# Patient Record
Sex: FEMALE | Race: WHITE
[De-identification: names, ages, dates, MRNs, and addresses within clinical notes are randomized per-mention and may not be internally consistent; named-entity substitution may affect disease eponyms.]

---

## 2020-01-24 ENCOUNTER — HOSPITAL ENCOUNTER (OUTPATIENT)
Age: 35
End: 2020-01-24
Payer: MEDICAID

## 2020-01-24 DIAGNOSIS — R13.10: ICD-10-CM

## 2020-01-24 DIAGNOSIS — J02.9: Primary | ICD-10-CM

## 2020-01-24 LAB
ALBUMIN LEVEL: 3.6 GM/DL (ref 3.4–5)
ALBUMIN/GLOB SERPL: 1.3 {RATIO} (ref 1.1–1.8)
ALP ISO SERPL-ACNC: 45 U/L (ref 46–116)
ALT SERPLBLD-CCNC: 81 U/L (ref 12–78)
ANION GAP SERPL CALC-SCNC: 9.3 MEQ/L (ref 5–15)
AST SERPL QL: 60 U/L (ref 15–37)
BILIRUBIN,TOTAL: 0.3 MG/DL (ref 0.2–1)
BUN SERPL-MCNC: 10 MG/DL (ref 7–18)
CALCIUM SPEC-MCNC: 8.5 MG/DL (ref 8.5–10.1)
CHLORIDE SPEC-SCNC: 107 MMOL/L (ref 98–107)
CO2 SERPL-SCNC: 28 MMOL/L (ref 21–32)
CREAT BLD-SCNC: 0.96 MG/DL (ref 0.55–1.02)
ESTIMATED GLOMERULAR FILT RATE: 67 ML/MIN (ref 60–?)
GFR (AFRICAN AMERICAN): 81 ML/MIN (ref 60–?)
GLOBULIN SER CALC-MCNC: 2.8 GM/DL (ref 1.3–3.2)
GLUCOSE: 84 MG/DL (ref 74–106)
HCT VFR BLD CALC: 35.8 % (ref 37–47)
HGB BLD-MCNC: 12.1 G/DL (ref 12.2–16.2)
MCHC RBC-ENTMCNC: 33.9 G/DL (ref 31.8–35.4)
MCV RBC: 96 FL (ref 81–99)
MEAN CORPUSCULAR HEMOGLOBIN: 32.6 PG (ref 27–31.2)
PLATELET # BLD: 159 K/MM3 (ref 142–424)
POTASSIUM: 4.3 MMOL/L (ref 3.5–5.1)
PROT SERPL-MCNC: 6.4 GM/DL (ref 6.4–8.2)
RBC # BLD AUTO: 3.73 M/MM3 (ref 4.2–5.4)
SODIUM SPEC-SCNC: 140 MMOL/L (ref 136–145)
T4 (THYROXINE): 7.5 UG/DL (ref 4.7–13.3)
TSH SERPL-ACNC: 0.42 UIU/ML (ref 0.36–3.74)
WBC # BLD AUTO: 4.2 K/MM3 (ref 4.8–10.8)

## 2020-01-24 PROCEDURE — 80053 COMPREHEN METABOLIC PANEL: CPT

## 2020-01-24 PROCEDURE — 84443 ASSAY THYROID STIM HORMONE: CPT

## 2020-01-24 PROCEDURE — 36415 COLL VENOUS BLD VENIPUNCTURE: CPT

## 2020-01-24 PROCEDURE — 84436 ASSAY OF TOTAL THYROXINE: CPT

## 2020-01-24 PROCEDURE — 86318 IA INFECTIOUS AGENT ANTIBODY: CPT

## 2020-01-24 PROCEDURE — 85025 COMPLETE CBC W/AUTO DIFF WBC: CPT

## 2020-02-10 ENCOUNTER — HOSPITAL ENCOUNTER (OUTPATIENT)
Age: 35
End: 2020-02-10
Payer: MEDICAID

## 2020-02-10 DIAGNOSIS — Z79.899: Primary | ICD-10-CM

## 2020-02-10 PROCEDURE — 80305 DRUG TEST PRSMV DIR OPT OBS: CPT

## 2020-02-24 ENCOUNTER — HOSPITAL ENCOUNTER (OUTPATIENT)
Age: 35
End: 2020-02-24
Payer: MEDICAID

## 2020-02-24 DIAGNOSIS — F11.10: Primary | ICD-10-CM

## 2020-02-24 PROCEDURE — 80305 DRUG TEST PRSMV DIR OPT OBS: CPT

## 2020-02-27 ENCOUNTER — HOSPITAL ENCOUNTER (OUTPATIENT)
Age: 35
End: 2020-02-27
Payer: MEDICAID

## 2020-02-27 DIAGNOSIS — F19.20: Primary | ICD-10-CM

## 2020-02-27 PROCEDURE — 80305 DRUG TEST PRSMV DIR OPT OBS: CPT

## 2020-05-21 ENCOUNTER — HOSPITAL ENCOUNTER (OUTPATIENT)
Age: 35
End: 2020-05-21
Payer: MEDICAID

## 2020-05-21 DIAGNOSIS — Z02.83: Primary | ICD-10-CM

## 2020-05-21 DIAGNOSIS — Z79.899: ICD-10-CM

## 2020-05-21 PROCEDURE — 80353 DRUG SCREENING COCAINE: CPT

## 2020-05-21 PROCEDURE — 80305 DRUG TEST PRSMV DIR OPT OBS: CPT

## 2020-05-29 LAB — BENZOYLECGONINE (GC/MS): (no result) NG/ML

## 2020-07-29 ENCOUNTER — HOSPITAL ENCOUNTER (OUTPATIENT)
Age: 35
End: 2020-07-29
Payer: MEDICAID

## 2020-07-29 DIAGNOSIS — R56.9: Primary | ICD-10-CM

## 2020-09-10 ENCOUNTER — HOSPITAL ENCOUNTER (EMERGENCY)
Age: 35
Discharge: HOME | End: 2020-09-10
Payer: MEDICAID

## 2020-09-10 VITALS
RESPIRATION RATE: 21 BRPM | OXYGEN SATURATION: 99 % | TEMPERATURE: 98.42 F | DIASTOLIC BLOOD PRESSURE: 81 MMHG | HEART RATE: 110 BPM | SYSTOLIC BLOOD PRESSURE: 129 MMHG

## 2020-09-10 VITALS — HEART RATE: 80 BPM

## 2020-09-10 VITALS
OXYGEN SATURATION: 99 % | SYSTOLIC BLOOD PRESSURE: 129 MMHG | TEMPERATURE: 98.4 F | HEART RATE: 80 BPM | RESPIRATION RATE: 21 BRPM | DIASTOLIC BLOOD PRESSURE: 81 MMHG

## 2020-09-10 VITALS — BODY MASS INDEX: 45.3 KG/M2

## 2020-09-10 DIAGNOSIS — Z87.891: ICD-10-CM

## 2020-09-10 DIAGNOSIS — R11.10: Primary | ICD-10-CM

## 2020-09-10 DIAGNOSIS — Z21: ICD-10-CM

## 2020-09-10 DIAGNOSIS — F12.10: ICD-10-CM

## 2020-09-10 DIAGNOSIS — Z03.818: ICD-10-CM

## 2020-09-10 DIAGNOSIS — F41.8: ICD-10-CM

## 2020-09-10 PROCEDURE — U0004 COV-19 TEST NON-CDC HGH THRU: HCPCS

## 2020-09-10 PROCEDURE — 99202 OFFICE O/P NEW SF 15 MIN: CPT

## 2021-03-23 ENCOUNTER — HOSPITAL ENCOUNTER (OUTPATIENT)
Age: 36
End: 2021-03-23
Payer: MEDICAID

## 2021-03-23 DIAGNOSIS — K43.9: Primary | ICD-10-CM

## 2021-03-23 PROCEDURE — 74176 CT ABD & PELVIS W/O CONTRAST: CPT

## 2021-03-30 ENCOUNTER — HOSPITAL ENCOUNTER (OUTPATIENT)
Age: 36
End: 2021-03-30
Payer: MEDICAID

## 2021-03-30 DIAGNOSIS — Z20.822: ICD-10-CM

## 2021-03-30 DIAGNOSIS — Z01.812: Primary | ICD-10-CM

## 2021-03-30 DIAGNOSIS — K43.9: ICD-10-CM

## 2021-03-30 LAB
ANION GAP SERPL CALC-SCNC: 14.3 MEQ/L (ref 5–15)
BUN SERPL-MCNC: 15 MG/DL (ref 7–17)
CALCIUM SPEC-MCNC: 9.4 MG/DL (ref 8.4–10.2)
CHLORIDE SPEC-SCNC: 107 MMOL/L (ref 98–107)
CO2 SERPL-SCNC: 21 MMOL/L (ref 22–30)
CREAT BLD-SCNC: 0.9 MG/DL (ref 0.52–1.04)
ESTIMATED GLOMERULAR FILT RATE: 72 ML/MIN (ref 60–?)
GFR (AFRICAN AMERICAN): 87 ML/MIN (ref 60–?)
GLUCOSE: 132 MG/DL (ref 74–100)
HCT VFR BLD CALC: 41 % (ref 37–47)
HGB BLD-MCNC: 13.8 G/DL (ref 12.2–16.2)
MCHC RBC-ENTMCNC: 33.7 G/DL (ref 31.8–35.4)
MCV RBC: 94.5 FL (ref 81–99)
MEAN CORPUSCULAR HEMOGLOBIN: 31.9 PG (ref 27–31.2)
PLATELET # BLD: 199 K/MM3 (ref 142–424)
POTASSIUM: 4.3 MMOL/L (ref 3.5–5.1)
RBC # BLD AUTO: 4.34 M/MM3 (ref 4.2–5.4)
SODIUM SPEC-SCNC: 138 MMOL/L (ref 136–145)
WBC # BLD AUTO: 7 K/MM3 (ref 4.8–10.8)

## 2021-03-30 PROCEDURE — 80048 BASIC METABOLIC PNL TOTAL CA: CPT

## 2021-03-30 PROCEDURE — 84703 CHORIONIC GONADOTROPIN ASSAY: CPT

## 2021-03-30 PROCEDURE — 86328 IA NFCT AB SARSCOV2 COVID19: CPT

## 2021-03-30 PROCEDURE — 85025 COMPLETE CBC W/AUTO DIFF WBC: CPT

## 2021-03-30 PROCEDURE — 36415 COLL VENOUS BLD VENIPUNCTURE: CPT

## 2021-04-01 ENCOUNTER — HOSPITAL ENCOUNTER (OUTPATIENT)
Dept: HOSPITAL 22 - OR | Age: 36
Discharge: HOME | End: 2021-04-01
Payer: MEDICAID

## 2021-04-01 VITALS
HEART RATE: 89 BPM | SYSTOLIC BLOOD PRESSURE: 123 MMHG | OXYGEN SATURATION: 100 % | DIASTOLIC BLOOD PRESSURE: 83 MMHG | RESPIRATION RATE: 18 BRPM

## 2021-04-01 VITALS
HEART RATE: 101 BPM | RESPIRATION RATE: 18 BRPM | OXYGEN SATURATION: 94 % | SYSTOLIC BLOOD PRESSURE: 131 MMHG | DIASTOLIC BLOOD PRESSURE: 87 MMHG

## 2021-04-01 VITALS
DIASTOLIC BLOOD PRESSURE: 92 MMHG | TEMPERATURE: 97 F | SYSTOLIC BLOOD PRESSURE: 113 MMHG | OXYGEN SATURATION: 96 % | HEART RATE: 77 BPM | RESPIRATION RATE: 18 BRPM

## 2021-04-01 VITALS
DIASTOLIC BLOOD PRESSURE: 84 MMHG | TEMPERATURE: 97.2 F | HEART RATE: 85 BPM | RESPIRATION RATE: 18 BRPM | OXYGEN SATURATION: 97 % | SYSTOLIC BLOOD PRESSURE: 113 MMHG

## 2021-04-01 VITALS
OXYGEN SATURATION: 94 % | DIASTOLIC BLOOD PRESSURE: 76 MMHG | HEART RATE: 100 BPM | SYSTOLIC BLOOD PRESSURE: 165 MMHG | RESPIRATION RATE: 18 BRPM | TEMPERATURE: 97.4 F

## 2021-04-01 VITALS
DIASTOLIC BLOOD PRESSURE: 90 MMHG | TEMPERATURE: 97.5 F | RESPIRATION RATE: 12 BRPM | OXYGEN SATURATION: 94 % | HEART RATE: 115 BPM | SYSTOLIC BLOOD PRESSURE: 137 MMHG

## 2021-04-01 VITALS
OXYGEN SATURATION: 98 % | HEART RATE: 97 BPM | RESPIRATION RATE: 18 BRPM | DIASTOLIC BLOOD PRESSURE: 77 MMHG | SYSTOLIC BLOOD PRESSURE: 117 MMHG

## 2021-04-01 VITALS
DIASTOLIC BLOOD PRESSURE: 79 MMHG | SYSTOLIC BLOOD PRESSURE: 123 MMHG | HEART RATE: 86 BPM | OXYGEN SATURATION: 100 % | RESPIRATION RATE: 18 BRPM

## 2021-04-01 VITALS
DIASTOLIC BLOOD PRESSURE: 55 MMHG | RESPIRATION RATE: 18 BRPM | OXYGEN SATURATION: 94 % | SYSTOLIC BLOOD PRESSURE: 114 MMHG | HEART RATE: 104 BPM | TEMPERATURE: 97.34 F

## 2021-04-01 VITALS — BODY MASS INDEX: 32.8 KG/M2

## 2021-04-01 DIAGNOSIS — Z82.3: ICD-10-CM

## 2021-04-01 DIAGNOSIS — Z86.73: ICD-10-CM

## 2021-04-01 DIAGNOSIS — K42.0: Primary | ICD-10-CM

## 2021-04-01 DIAGNOSIS — Z72.0: ICD-10-CM

## 2021-04-01 DIAGNOSIS — R56.9: ICD-10-CM

## 2021-04-01 DIAGNOSIS — Z80.0: ICD-10-CM

## 2021-04-01 DIAGNOSIS — F41.9: ICD-10-CM

## 2021-04-01 DIAGNOSIS — I72.9: ICD-10-CM

## 2021-04-01 DIAGNOSIS — I67.89: ICD-10-CM

## 2021-04-01 DIAGNOSIS — Z79.899: ICD-10-CM

## 2021-04-01 DIAGNOSIS — F32.9: ICD-10-CM

## 2021-04-01 LAB
COLOR UR: YELLOW
MICRO URNS: (no result)
PH UR: 6 [PH] (ref 5–8.5)
SP GR UR: 1.02 (ref 1–1.03)
SQUAMOUS URNS QL MICRO: (no result) #/HPF (ref 0–5)
UROBILINOGEN UR QL: 0.2 EU/DL
WBC # UR: (no result) #/HPF (ref 0–3)

## 2021-04-01 PROCEDURE — 81001 URINALYSIS AUTO W/SCOPE: CPT

## 2021-04-01 PROCEDURE — 81025 URINE PREGNANCY TEST: CPT

## 2021-04-01 PROCEDURE — 96374 THER/PROPH/DIAG INJ IV PUSH: CPT

## 2021-04-01 PROCEDURE — 49587: CPT

## 2021-04-05 VITALS — SYSTOLIC BLOOD PRESSURE: 117 MMHG | HEART RATE: 97 BPM | DIASTOLIC BLOOD PRESSURE: 77 MMHG | TEMPERATURE: 97.34 F

## 2022-06-29 LAB
25-OH VITAMIN D, TOTAL: 38.2 NG/ML (ref 30–100)
ALBUMIN LEVEL: 4.1 G/DL (ref 3.5–5)
ALBUMIN/GLOB SERPL: 1.3 {RATIO} (ref 1.1–1.8)
ALP ISO SERPL-ACNC: 71 U/L (ref 38–126)
ALT SERPLBLD-CCNC: 59 U/L (ref 12–78)
ANION GAP SERPL CALC-SCNC: 14.2 MEQ/L (ref 5–15)
AST SERPL QL: 54 U/L (ref 14–36)
BILIRUBIN,TOTAL: < 0.1 MG/DL (ref 0.2–1.3)
BUN SERPL-MCNC: 17 MG/DL (ref 7–17)
CALCIUM SPEC-MCNC: 8.9 MG/DL (ref 8.4–10.2)
CHLORIDE SPEC-SCNC: 107 MMOL/L (ref 98–107)
CHOLEST SPEC-SCNC: 200 MG/DL (ref 140–200)
CO2 SERPL-SCNC: 20 MMOL/L (ref 22–30)
CREAT BLD-SCNC: 0.7 MG/DL (ref 0.52–1.04)
ESTIMATED GLOMERULAR FILT RATE: 95 ML/MIN (ref 60–?)
GFR (AFRICAN AMERICAN): 115 ML/MIN (ref 60–?)
GLOBULIN SER CALC-MCNC: 3.1 G/DL (ref 1.3–3.2)
GLUCOSE: 112 MG/DL (ref 74–100)
HCT VFR BLD CALC: 38.4 % (ref 37–47)
HDLC SERPL-MCNC: 45 MG/DL (ref 40–60)
HGB BLD-MCNC: 13 G/DL (ref 12.2–16.2)
MCHC RBC-ENTMCNC: 33.9 G/DL (ref 31.8–35.4)
MCV RBC: 93.5 FL (ref 81–99)
MEAN CORPUSCULAR HEMOGLOBIN: 31.7 PG (ref 27–31.2)
PLATELET # BLD: 178 K/MM3 (ref 142–424)
POTASSIUM: 4.2 MMOL/L (ref 3.5–5.1)
PROT SERPL-MCNC: 7.2 G/DL (ref 6.3–8.2)
RBC # BLD AUTO: 4.1 M/MM3 (ref 4.2–5.4)
SODIUM SPEC-SCNC: 137 MMOL/L (ref 136–145)
T4 FREE SERPL-MCNC: 0.89 NG/DL (ref 0.78–2.19)
TRIGLYCERIDES: 137 MG/DL (ref 30–150)
TSH SERPL-ACNC: 1.26 UIU/ML (ref 0.47–4.68)
WBC # BLD AUTO: 6.7 K/MM3 (ref 4.8–10.8)

## 2022-06-30 ENCOUNTER — HOSPITAL ENCOUNTER (OUTPATIENT)
Age: 37
End: 2022-06-30
Payer: MEDICAID

## 2022-06-30 DIAGNOSIS — E55.9: ICD-10-CM

## 2022-06-30 DIAGNOSIS — E66.9: Primary | ICD-10-CM

## 2022-06-30 PROCEDURE — 85025 COMPLETE CBC W/AUTO DIFF WBC: CPT

## 2022-06-30 PROCEDURE — 84439 ASSAY OF FREE THYROXINE: CPT

## 2022-06-30 PROCEDURE — 80061 LIPID PANEL: CPT

## 2022-06-30 PROCEDURE — 82306 VITAMIN D 25 HYDROXY: CPT

## 2022-06-30 PROCEDURE — 84443 ASSAY THYROID STIM HORMONE: CPT

## 2022-06-30 PROCEDURE — 80053 COMPREHEN METABOLIC PANEL: CPT

## 2022-07-08 ENCOUNTER — HOSPITAL ENCOUNTER (OUTPATIENT)
Age: 37
End: 2022-07-08
Payer: MEDICAID

## 2022-07-08 DIAGNOSIS — R13.10: Primary | ICD-10-CM

## 2022-07-08 PROCEDURE — 74220 X-RAY XM ESOPHAGUS 1CNTRST: CPT

## 2022-09-09 ENCOUNTER — HOSPITAL ENCOUNTER (OUTPATIENT)
Age: 37
End: 2022-09-09
Payer: MEDICAID

## 2022-09-09 DIAGNOSIS — Z02.83: ICD-10-CM

## 2022-09-09 DIAGNOSIS — Z00.00: Primary | ICD-10-CM

## 2022-09-09 DIAGNOSIS — Z79.899: ICD-10-CM

## 2022-09-09 LAB
ALBUMIN LEVEL: 4 G/DL (ref 3.5–5)
ALBUMIN/GLOB SERPL: 1.3 {RATIO} (ref 1.1–1.8)
ALP ISO SERPL-ACNC: 108 U/L (ref 38–126)
ALT SERPLBLD-CCNC: 38 U/L (ref 12–78)
ANION GAP SERPL CALC-SCNC: 13.2 MEQ/L (ref 5–15)
AST SERPL QL: 38 U/L (ref 14–36)
BILIRUBIN,TOTAL: < 0.1 MG/DL (ref 0.2–1.3)
BUN SERPL-MCNC: 11 MG/DL (ref 7–17)
CALCIUM SPEC-MCNC: 8.5 MG/DL (ref 8.4–10.2)
CHLORIDE SPEC-SCNC: 104 MMOL/L (ref 98–107)
CO2 SERPL-SCNC: 26 MMOL/L (ref 22–30)
CREAT BLD-SCNC: 0.8 MG/DL (ref 0.52–1.04)
ESTIMATED GLOMERULAR FILT RATE: 82 ML/MIN (ref 60–?)
FT4I SERPL CALC-MCNC: 1.7 UG/DL (ref 5.93–13.13)
GFR (AFRICAN AMERICAN): 99 ML/MIN (ref 60–?)
GLOBULIN SER CALC-MCNC: 3.2 G/DL (ref 1.3–3.2)
GLUCOSE: 90 MG/DL (ref 74–100)
HBA1C MFR BLD: 5 % (ref 4–6)
HCT VFR BLD CALC: 40.3 % (ref 37–47)
HGB BLD-MCNC: 13.1 G/DL (ref 12.2–16.2)
MCHC RBC-ENTMCNC: 32.5 G/DL (ref 31.8–35.4)
MCV RBC: 99.7 FL (ref 81–99)
MEAN CORPUSCULAR HEMOGLOBIN: 32.4 PG (ref 27–31.2)
PLATELET # BLD: 198 K/MM3 (ref 142–424)
POTASSIUM: 4.2 MMOL/L (ref 3.5–5.1)
PROT SERPL-MCNC: 7.2 G/DL (ref 6.3–8.2)
RBC # BLD AUTO: 4.04 M/MM3 (ref 4.2–5.4)
SODIUM SPEC-SCNC: 139 MMOL/L (ref 136–145)
T3RU NFR SERPL: 28 % (ref 23.5–40.5)
T4 (THYROXINE): 6.1 UG/DL (ref 5.53–11)
TSH SERPL-ACNC: 1.81 UIU/ML (ref 0.47–4.68)
VITAMIN B12: 213 PG/ML (ref 239–931)
WBC # BLD AUTO: 8 K/MM3 (ref 4.8–10.8)

## 2022-09-09 PROCEDURE — 84443 ASSAY THYROID STIM HORMONE: CPT

## 2022-09-09 PROCEDURE — 82652 VIT D 1 25-DIHYDROXY: CPT

## 2022-09-09 PROCEDURE — 84436 ASSAY OF TOTAL THYROXINE: CPT

## 2022-09-09 PROCEDURE — 82607 VITAMIN B-12: CPT

## 2022-09-09 PROCEDURE — 80305 DRUG TEST PRSMV DIR OPT OBS: CPT

## 2022-09-09 PROCEDURE — 80053 COMPREHEN METABOLIC PANEL: CPT

## 2022-09-09 PROCEDURE — 36415 COLL VENOUS BLD VENIPUNCTURE: CPT

## 2022-09-09 PROCEDURE — 83036 HEMOGLOBIN GLYCOSYLATED A1C: CPT

## 2022-09-09 PROCEDURE — 85025 COMPLETE CBC W/AUTO DIFF WBC: CPT

## 2022-09-09 PROCEDURE — 84479 ASSAY OF THYROID (T3 OR T4): CPT

## 2022-09-18 LAB
1,25-DIHYDROXY, VITAMIN D-2: <10 PG/ML
1,25-DIHYDROXY, VITAMIN D-3: 52 PG/ML
VIT D25+D1,25 OH+D1,25 PNL SERPL-MCNC: 52 PG/ML

## 2022-09-21 ENCOUNTER — HOSPITAL ENCOUNTER (OUTPATIENT)
Age: 37
End: 2022-09-21
Payer: MEDICAID

## 2022-09-21 DIAGNOSIS — Z79.899: Primary | ICD-10-CM

## 2022-09-21 PROCEDURE — 80305 DRUG TEST PRSMV DIR OPT OBS: CPT

## 2022-12-14 ENCOUNTER — HOSPITAL ENCOUNTER (OUTPATIENT)
Age: 37
End: 2022-12-14
Payer: MEDICAID

## 2022-12-14 DIAGNOSIS — Z79.899: Primary | ICD-10-CM

## 2022-12-14 PROCEDURE — 80305 DRUG TEST PRSMV DIR OPT OBS: CPT

## 2023-04-18 ENCOUNTER — HOSPITAL ENCOUNTER (OUTPATIENT)
Age: 38
End: 2023-04-18
Payer: MEDICAID

## 2023-04-18 DIAGNOSIS — Z79.899: Primary | ICD-10-CM

## 2023-04-18 PROCEDURE — 80305 DRUG TEST PRSMV DIR OPT OBS: CPT

## 2023-06-05 ENCOUNTER — HOSPITAL ENCOUNTER (OUTPATIENT)
Age: 38
End: 2023-06-05
Payer: MEDICAID

## 2023-06-05 DIAGNOSIS — F19.11: Primary | ICD-10-CM

## 2023-06-05 PROCEDURE — 80305 DRUG TEST PRSMV DIR OPT OBS: CPT

## 2023-06-09 ENCOUNTER — HOSPITAL ENCOUNTER (OUTPATIENT)
Age: 38
End: 2023-06-09
Payer: MEDICAID

## 2023-06-09 DIAGNOSIS — M25.572: ICD-10-CM

## 2023-06-09 DIAGNOSIS — M79.672: Primary | ICD-10-CM

## 2023-06-09 PROCEDURE — 73610 X-RAY EXAM OF ANKLE: CPT

## 2023-06-09 PROCEDURE — 73630 X-RAY EXAM OF FOOT: CPT

## 2023-07-17 ENCOUNTER — HOSPITAL ENCOUNTER (OUTPATIENT)
Dept: HOSPITAL 22 - PT | Age: 38
Discharge: HOME | End: 2023-07-17
Payer: MEDICAID

## 2023-07-17 DIAGNOSIS — M79.672: Primary | ICD-10-CM

## 2023-07-17 DIAGNOSIS — M25.572: ICD-10-CM

## 2023-07-17 PROCEDURE — 97760 ORTHOTIC MGMT&TRAING 1ST ENC: CPT

## 2023-09-11 ENCOUNTER — HOSPITAL ENCOUNTER (OUTPATIENT)
Age: 38
End: 2023-09-11
Payer: MEDICAID

## 2023-09-11 DIAGNOSIS — F41.9: Primary | ICD-10-CM

## 2023-09-11 PROCEDURE — 80305 DRUG TEST PRSMV DIR OPT OBS: CPT

## 2023-12-26 ENCOUNTER — HOSPITAL ENCOUNTER (OUTPATIENT)
Dept: HOSPITAL 22 - LAB.DROPOF | Age: 38
End: 2023-12-26
Payer: MEDICAID

## 2023-12-26 DIAGNOSIS — M79.2: Primary | ICD-10-CM

## 2023-12-26 DIAGNOSIS — B96.29: ICD-10-CM

## 2023-12-26 DIAGNOSIS — N39.0: ICD-10-CM

## 2023-12-26 DIAGNOSIS — Z79.899: ICD-10-CM

## 2023-12-26 PROCEDURE — 87086 URINE CULTURE/COLONY COUNT: CPT

## 2023-12-26 PROCEDURE — 80307 DRUG TEST PRSMV CHEM ANLYZR: CPT

## 2024-01-30 ENCOUNTER — TELEPHONE (OUTPATIENT)
Dept: FAMILY MEDICINE CLINIC | Facility: CLINIC | Age: 39
End: 2024-01-30

## 2024-01-30 ENCOUNTER — OFFICE VISIT (OUTPATIENT)
Dept: FAMILY MEDICINE CLINIC | Facility: CLINIC | Age: 39
End: 2024-01-30
Payer: MEDICAID

## 2024-01-30 VITALS
RESPIRATION RATE: 18 BRPM | OXYGEN SATURATION: 99 % | HEART RATE: 96 BPM | DIASTOLIC BLOOD PRESSURE: 80 MMHG | HEIGHT: 67 IN | BODY MASS INDEX: 36.26 KG/M2 | SYSTOLIC BLOOD PRESSURE: 102 MMHG | WEIGHT: 231 LBS | TEMPERATURE: 97.8 F

## 2024-01-30 DIAGNOSIS — F17.200 SMOKER: ICD-10-CM

## 2024-01-30 DIAGNOSIS — F19.11 HISTORY OF DRUG ABUSE: ICD-10-CM

## 2024-01-30 DIAGNOSIS — F41.9 ANXIETY AND DEPRESSION: Primary | ICD-10-CM

## 2024-01-30 DIAGNOSIS — Z86.73 HISTORY OF STROKE: ICD-10-CM

## 2024-01-30 DIAGNOSIS — G89.4 CHRONIC PAIN SYNDROME: ICD-10-CM

## 2024-01-30 DIAGNOSIS — E66.01 CLASS 2 SEVERE OBESITY DUE TO EXCESS CALORIES WITH SERIOUS COMORBIDITY AND BODY MASS INDEX (BMI) OF 36.0 TO 36.9 IN ADULT: ICD-10-CM

## 2024-01-30 DIAGNOSIS — F32.A ANXIETY AND DEPRESSION: Primary | ICD-10-CM

## 2024-01-30 DIAGNOSIS — F51.01 PRIMARY INSOMNIA: ICD-10-CM

## 2024-01-30 DIAGNOSIS — G81.94 LEFT HEMIPARESIS: ICD-10-CM

## 2024-01-30 RX ORDER — MIRTAZAPINE 45 MG/1
45 TABLET, FILM COATED ORAL
COMMUNITY
Start: 2024-01-26

## 2024-01-30 RX ORDER — ALPRAZOLAM 1 MG/1
1 TABLET ORAL 3 TIMES DAILY PRN
COMMUNITY
Start: 2023-12-22

## 2024-01-30 RX ORDER — BUPRENORPHINE HYDROCHLORIDE AND NALOXONE HYDROCHLORIDE DIHYDRATE 8; 2 MG/1; MG/1
1 TABLET SUBLINGUAL DAILY
COMMUNITY
Start: 2024-01-26

## 2024-01-30 RX ORDER — GABAPENTIN 800 MG/1
800 TABLET ORAL 3 TIMES DAILY
COMMUNITY
Start: 2024-01-25

## 2024-01-30 RX ORDER — BACLOFEN 10 MG/1
1 TABLET ORAL 3 TIMES DAILY
COMMUNITY
Start: 2024-01-19

## 2024-01-30 RX ORDER — CLONAZEPAM 1 MG/1
1 TABLET ORAL 2 TIMES DAILY PRN
COMMUNITY
Start: 2024-01-25

## 2024-01-30 NOTE — ASSESSMENT & PLAN NOTE
2019 treated at the [facility] per her report.  She suffers from residual left hemiparesis taking gabapentin. Says gabapentin doesn't work.

## 2024-01-30 NOTE — ASSESSMENT & PLAN NOTE
In suboxone therapy at [place].  Patient initially states that she was put in Suboxone clinic when she felt having issues with drug abuse.  As air interview portion went on she does close she does have issues with polysubstance abuse including narcotics, cocaine, heroin and meth.

## 2024-01-30 NOTE — ASSESSMENT & PLAN NOTE
With longstanding issues regarding anxiety and depression.  In her past medical history manic affective disorder with recurrent episodes and PTSD are noted as well.  Patient is poor historian, focused on getting prescription for alprazolam.  She states she was treated with alprazolam by Dr. Diane and that is the only thing that helps with her anxiety.  She tells me that she has been referred to behavioral health in [place] but failed to keep the appointment.  Patient to continue on daily clonazepam, she has been informed that I will not be providing benzodiazepine therapy for her, will not be prescribing Ativan, Xanax or any other benzo therapy.  She will also continue on her sertraline 50 mg once daily.  I have given her contact information for [facility] as it is local and perhaps she will have an easier time getting there.

## 2024-01-30 NOTE — PROGRESS NOTES
Office Note     Name: Vidhi Badillo    : 1985     MRN: [id number]     Chief Complaint  Annual Exam and Establish Care    Subjective     History of Present Illness:  Vidhi Badillo is a 38 y.o. female who presents today to establish care.  Patient states that she has been a patient of Dr. Diane in [place] for a number of years. Patient followed for chronic conditions including anxiety, depression, chronic pain syndrome, left hemiparesis, obesity, insomnia.  Patient is a 1 pack/day smoker for the last 20 years.  Patient also has history of polysubstance abuse and stroke.  Patient states her stroke occurred in 2019, she was treated at the [facility] per her report.  Residual effects include extensive left hemiparesis.  Patient also notes ongoing nerve pain poststroke.  In review of [facility] report it appears patient has history of controlled substances including Klonopin, alprazolam, hydrocodone and gabapentin since 2023 under the care of Dr. Diane in [place]. Patient states that she was referred to a Suboxone clinic by Dr. Diane but she did not feel she needed Suboxone therapy.  On the other hand she also tells me that she has history of polysubstance abuse including narcotics, methamphetamine, cocaine and heroin.  Patient reports that she was asked to perform oral sex for Dr. Diane in order to get her controlled substances. She states that she recently has been given clonazepam for her anxiety and depression, however the only effective medication she has ever been able to utilize was alprazolam.  Patient has consistently contradicted herself in our conversation.  Initially she tells me that she was referred to a mental health provider in [place] but her appointment is not until next week.  I advised patient that I would not be prescribing any controlled substances including her alprazolam, she will need to seek that medication from either her Suboxone clinic or  "her mental health provider.  Patient then requested that I prescribe her a 30-day dose of clonazepam \"to get her through until her visit\".  In review of her [facility] she just received a 30-day prescription for clonazepam 4 days ago.  When I advised her of this she then states that her referred appointment for mental health was actually last week and she did not go.  I suggested referral to pain management for chronic pain that she is taking hydrocodone for, she has been unable to obtain hydrocodone prescription since not being seen by Dr. Diane.  Patient states she is uninterested in pain management referral initially, though when I told her I would not be prescribing her hydrocodone she was willing to go with pain management referral.   Review of Systems   Constitutional:  Positive for fatigue. Negative for activity change.   Eyes:  Negative for visual disturbance.   Respiratory:  Negative for cough, chest tightness, shortness of breath and wheezing.    Cardiovascular:  Negative for chest pain, palpitations and leg swelling.   Gastrointestinal:  Negative for abdominal pain, constipation, diarrhea, nausea and vomiting.   Endocrine: Negative for polydipsia and polyuria.   Musculoskeletal:  Positive for back pain.   Neurological:  Positive for weakness and numbness. Negative for dizziness, light-headedness and headache.   Psychiatric/Behavioral:  Positive for agitation, depressed mood and stress. Negative for self-injury and suicidal ideas. The patient is nervous/anxious.        Objective     Past Medical History:   Diagnosis Date    Anxiety     Depression     Manic affective disorder with recurrent episode     PTSD (post-traumatic stress disorder)     Stroke      Past Surgical History:   Procedure Laterality Date     SECTION      CYST REMOVAL      TUBAL ABDOMINAL LIGATION       Family History   Problem Relation Age of Onset    Heart disease Mother     Hypertension Mother     Cancer Mother     Heart disease " "Father     Hypertension Father     Cancer Father        Vital Signs  /80 (BP Location: Right arm, Patient Position: Sitting, Cuff Size: Adult)   Pulse 96   Temp 97.8 °F (36.6 °C) (Temporal)   Resp 18   Ht 170.2 cm (67\")   Wt 105 kg (231 lb)   SpO2 99%   BMI 36.18 kg/m²   Estimated body mass index is 36.18 kg/m² as calculated from the following:    Height as of this encounter: 170.2 cm (67\").    Weight as of this encounter: 105 kg (231 lb).    Physical Exam  Vitals reviewed.   HENT:      Head: Normocephalic and atraumatic.      Right Ear: Tympanic membrane, ear canal and external ear normal.      Left Ear: Tympanic membrane, ear canal and external ear normal.      Nose: Nose normal.      Mouth/Throat:      Mouth: Mucous membranes are moist.      Pharynx: Oropharynx is clear.   Eyes:      Conjunctiva/sclera: Conjunctivae normal.      Pupils: Pupils are equal, round, and reactive to light.   Cardiovascular:      Rate and Rhythm: Normal rate and regular rhythm.      Pulses: Normal pulses.      Heart sounds: Normal heart sounds.   Pulmonary:      Effort: Pulmonary effort is normal.      Breath sounds: Normal breath sounds.   Abdominal:      General: Bowel sounds are normal.      Palpations: Abdomen is soft.   Musculoskeletal:         General: Normal range of motion.      Cervical back: Neck supple.   Skin:     General: Skin is warm and dry.   Neurological:      Mental Status: She is alert and oriented to person, place, and time.   Psychiatric:         Attention and Perception: Attention normal.         Mood and Affect: Affect is flat.         Behavior: Behavior is slowed. Behavior is cooperative.            POCT Results (if applicable):  No results found for this or any previous visit.         Assessment and Plan     Diagnoses and all orders for this visit:    1. Anxiety and depression (Primary)  Assessment & Plan:  With longstanding issues regarding anxiety and depression.  In her past medical history manic " affective disorder with recurrent episodes and PTSD are noted as well.  Patient is poor historian, focused on getting prescription for alprazolam.  She states she was treated with alprazolam by Dr. Diane and that is the only thing that helps with her anxiety.  She tells me that she has been referred to behavioral health in [place] but failed to keep the appointment.  Patient to continue on daily clonazepam, she has been informed that I will not be providing benzodiazepine therapy for her, will not be prescribing Ativan, Xanax or any other benzo therapy.  She will also continue on her sertraline 50 mg once daily.  I have given her contact information for [facility] as it is local and perhaps she will have an easier time getting there.      2. History of drug abuse  Assessment & Plan:  In suboxone therapy at [facility].  Patient initially states that she was put in Suboxone clinic when she felt having issues with drug abuse.  As air interview portion went on she does close she does have issues with polysubstance abuse including narcotics, cocaine, heroin and meth.       3. History of stroke  Assessment & Plan:  2019 treated at the [facility] per her report.  She suffers from residual left hemiparesis taking gabapentin. Says gabapentin doesn't work.       4. Primary insomnia  Assessment & Plan:  Patient currently taking regimen of mirtazapine 45 mg nightly at bedtime.      5. Class 2 severe obesity due to excess calories with serious comorbidity and body mass index (BMI) of 36.0 to 36.9 in adult  Assessment & Plan:  Patient's (Body mass index is 36.18 kg/m².) indicates that they are obese (BMI >30) with health conditions that include none . Weight is unchanged. BMI  is above average; BMI management plan is completed. We discussed portion control and increasing exercise.       6. Smoker  Assessment & Plan:  1 ppds for 20 years      7. Left hemiparesis  Assessment & Plan:  Residual from  stroke suffered in 2019      8. Chronic pain syndrome  Assessment & Plan:  Patient states she is issues with chronic pain since her stroke, both neuropathies and back pain.  She continues to take gabapentin 800 mg 3 times daily.  Patient states she took oxycodone in the past, has not had any of that particular medications that she stopped seeing Dr. GYN at the end of November 2023.  She is informed that I would not be prescribing any controlled substance pain management such as oxycodone.  She initially declined pain management referral for further evaluation and treatment, patient was in a very visit was agreeable.  Referral placed to Dr. Nur for further evaluation and treatment    Orders:  -     Ambulatory Referral to Pain Management      Class 2 Severe Obesity (BMI >=35 and <=39.9). Obesity-related health conditions include the following: none. Obesity is unchanged. BMI is is above average; BMI management plan is completed. We discussed portion control and increasing exercise.        Vaccine Counseling:  “Discussed risks/benefits to vaccination, reviewed components of the vaccine, discussed VIS, discussed informed consent, informed consent obtained. Patient/Parent was allowed to accept or refuse vaccine. Questions answered to satisfactory state of patient/Parent. We reviewed typical age appropriate and seasonally appropriate vaccinations. Reviewed immunization history and updated state vaccination form as needed. Patient was counseled on COVID-19  DTap/DT  Influenza    Advanced Care Planning:   Patient does not have an advance directive, declines further assistance.    Smoking Cessation:   less than 3 minutes spent counseling Not agreeable to stopping    Follow Up  No follow-ups on file.    Naomi Kinney, APRN

## 2024-01-31 ENCOUNTER — TELEPHONE (OUTPATIENT)
Dept: FAMILY MEDICINE CLINIC | Facility: CLINIC | Age: 39
End: 2024-01-31
Payer: MEDICAID

## 2024-01-31 PROBLEM — G89.4 CHRONIC PAIN SYNDROME: Status: ACTIVE | Noted: 2024-01-31

## 2024-01-31 NOTE — ASSESSMENT & PLAN NOTE
Patient's (Body mass index is 36.18 kg/m².) indicates that they are obese (BMI >30) with health conditions that include none . Weight is unchanged. BMI  is above average; BMI management plan is completed. We discussed portion control and increasing exercise.

## 2024-01-31 NOTE — ASSESSMENT & PLAN NOTE
Patient states she is issues with chronic pain since her stroke, both neuropathies and back pain.  She continues to take gabapentin 800 mg 3 times daily.  Patient states she took oxycodone in the past, has not had any of that particular medications that she stopped seeing Dr. GYN at the end of November 2023.  She is informed that I would not be prescribing any controlled substance pain management such as oxycodone.  She initially declined pain management referral for further evaluation and treatment, patient was in a very visit was agreeable.  Referral placed to Dr. Nur for further evaluation and treatment

## 2024-02-01 ENCOUNTER — TELEPHONE (OUTPATIENT)
Dept: FAMILY MEDICINE CLINIC | Facility: CLINIC | Age: 39
End: 2024-02-01
Payer: MEDICAID

## 2024-02-01 NOTE — TELEPHONE ENCOUNTER
Caller: Vidhi Badillo    Relationship: Self    Best call back number: [phone]    What was the call regarding: PATIENT CALLED ASKING WHAT SHE WAS REFERRED TO DO.  PATIENT STATED IT WAS FOR MEDICATION.

## 2024-02-07 ENCOUNTER — TELEPHONE (OUTPATIENT)
Dept: FAMILY MEDICINE CLINIC | Facility: CLINIC | Age: 39
End: 2024-02-07
Payer: MEDICAID

## 2024-02-07 NOTE — TELEPHONE ENCOUNTER
Called and spoke with patient and advised her that we would not write a controlled rx for her that she would have to go to the Suboxone clinic. She states that she was going to have to go back to Dr rubio's office. And stressed to her that we would not write the rx, she verbalized understanding

## 2024-02-07 NOTE — TELEPHONE ENCOUNTER
Caller: Vidhi Badillo    Relationship: Self    Best call back number: [phone]    Which medication are you concerned about: GABAPENTON    Who prescribed you this medication: ASHLEY BARNETT    When did you start taking this medication:     What are your concerns: PATIENT SAYS SHE DOES NOT WANT TO GO TO SUBOXINE CLINIC IN ORDER TO GET HER GABAPENTON. WOULD LIKE A CALL BACK TODAY    How long have you had these concerns:

## 2024-04-30 ENCOUNTER — HOSPITAL ENCOUNTER (OUTPATIENT)
Age: 39
Discharge: HOME | End: 2024-04-30
Payer: MEDICAID

## 2024-04-30 DIAGNOSIS — R53.83: ICD-10-CM

## 2024-04-30 DIAGNOSIS — R79.89: Primary | ICD-10-CM

## 2024-04-30 DIAGNOSIS — E55.9: ICD-10-CM

## 2024-04-30 LAB
25-OH VITAMIN D, TOTAL: 27.1 NG/ML (ref 30–100)
ALBUMIN LEVEL: 4.3 G/DL (ref 3.5–5)
ALBUMIN/GLOB SERPL: 1.4 {RATIO} (ref 1.1–1.8)
ALP ISO SERPL-ACNC: 86 U/L (ref 38–126)
ALT SERPLBLD-CCNC: 19 U/L (ref 12–78)
ANION GAP SERPL CALC-SCNC: 15.3 MEQ/L (ref 5–15)
AST SERPL QL: 23 U/L (ref 14–36)
BILIRUBIN,TOTAL: 0.4 MG/DL (ref 0.2–1.3)
BUN SERPL-MCNC: 13 MG/DL (ref 7–17)
CALCIUM SPEC-MCNC: 9.8 MG/DL (ref 8.4–10.2)
CHLORIDE SPEC-SCNC: 110 MMOL/L (ref 98–107)
CHOLEST SPEC-SCNC: 295 MG/DL (ref 140–200)
CO2 SERPL-SCNC: 19 MMOL/L (ref 22–30)
CREATININE,SERUM: 1 MG/DL (ref 0.52–1.04)
ESTIMATED GLOMERULAR FILT RATE: 62 ML/MIN (ref 60–?)
GFR (AFRICAN AMERICAN): 75 ML/MIN (ref 60–?)
GLOBULIN SER CALC-MCNC: 3 G/DL (ref 1.3–3.2)
GLUCOSE: 89 MG/DL (ref 74–100)
HBA1C MFR BLD: 5.4 % (ref 4–6)
HCT VFR BLD CALC: 44.6 % (ref 37–47)
HDLC SERPL-MCNC: 55 MG/DL (ref 40–60)
HGB BLD-MCNC: 14.9 G/DL (ref 12.2–16.2)
MCHC RBC-ENTMCNC: 33.4 G/DL (ref 31.8–35.4)
MCV RBC: 99.5 FL (ref 81–99)
MEAN CORPUSCULAR HEMOGLOBIN: 33.2 PG (ref 27–31.2)
PLATELET # BLD: 190 K/MM3 (ref 142–424)
POTASSIUM: 4.3 MMOL/L (ref 3.5–5.1)
PROT SERPL-MCNC: 7.3 G/DL (ref 6.3–8.2)
RBC # BLD AUTO: 4.49 M/MM3 (ref 4.2–5.4)
SODIUM SPEC-SCNC: 140 MMOL/L (ref 136–145)
TRIGLYCERIDES: 175 MG/DL (ref 30–150)
VITAMIN B12: 215 PG/ML (ref 239–931)
WBC # BLD AUTO: 10.1 K/MM3 (ref 4.8–10.8)

## 2024-04-30 PROCEDURE — 85025 COMPLETE CBC W/AUTO DIFF WBC: CPT

## 2024-04-30 PROCEDURE — 82570 ASSAY OF URINE CREATININE: CPT

## 2024-04-30 PROCEDURE — 82043 UR ALBUMIN QUANTITATIVE: CPT

## 2024-04-30 PROCEDURE — 82306 VITAMIN D 25 HYDROXY: CPT

## 2024-04-30 PROCEDURE — 82607 VITAMIN B-12: CPT

## 2024-04-30 PROCEDURE — 80053 COMPREHEN METABOLIC PANEL: CPT

## 2024-04-30 PROCEDURE — 83036 HEMOGLOBIN GLYCOSYLATED A1C: CPT

## 2024-04-30 PROCEDURE — 80061 LIPID PANEL: CPT
